# Patient Record
(demographics unavailable — no encounter records)

---

## 2025-05-14 NOTE — DISCUSSION/SUMMARY
[FreeTextEntry1] : Cervical stump Prolapse: Ring and support  # 5  removed, cleaned, inspected and reinserted. The patient tolerated this well. No bleeding, lesions, abnormal discharge were noted. Continue self maintaining pessary  The patient will follow up in 12 months for routine pessary management.   Atrophic Vaginitis: Advised to continue to apply a pea size amount of the cream to the opening of the vagina three nights per week.

## 2025-05-14 NOTE — COUNSELING
[FreeTextEntry1] : Please call the office if you have any issues with vaginal pain, vaginal bleeding, difficulty urinating or having a bowel movement or if the pessary falls out so that we can arrange another size pessary.   Please continue clean the pessary.   Please follow up for pessary maintenance in 12 months with BEN Martinez or GOYO Cabrera.

## 2025-05-14 NOTE — PHYSICAL EXAM
[Chaperoned Physical Exam] : A chaperone was present in the examining room during all aspects of the physical examination. [MA] : MA [FreeTextEntry2] : Arabella [No Acute Distress] : in no acute distress [Well developed] : well developed [Well Nourished] : ~L well nourished

## 2025-05-14 NOTE — HISTORY OF PRESENT ILLNESS
[FreeTextEntry1] : Patient is here for routine pessary cleaning for cervical stump prolapse. Last seen 5/15/24 for pessary cleaning. ring and support # 5   Med/Surg Hx- 2016 - Dr. Taylor: Robotic-assisted AHSAN Hx of DM 2   Ring and support #5, self maintains nightly estrace   Today, patient is doing well and has no complaints. Doing well with the pessary, cleans it every night. Sexually active with no issues. Denies bleeding or discharge. Sees her GYN annually.

## 2025-05-14 NOTE — REASON FOR VISIT
[TextEntry] : CC: pt is here for a 1 year pessary check, states she self maintains pessary every night, last cleaned last night, no issues at all with the pessary, no symptoms of a uti vaginal bleeding or discharge.